# Patient Record
Sex: MALE | Race: WHITE | NOT HISPANIC OR LATINO | Employment: FULL TIME | URBAN - METROPOLITAN AREA
[De-identification: names, ages, dates, MRNs, and addresses within clinical notes are randomized per-mention and may not be internally consistent; named-entity substitution may affect disease eponyms.]

---

## 2022-03-04 ENCOUNTER — TELEPHONE (OUTPATIENT)
Dept: CARDIOLOGY | Facility: MEDICAL CENTER | Age: 67
End: 2022-03-04
Payer: COMMERCIAL

## 2022-03-04 NOTE — TELEPHONE ENCOUNTER
Spoke with pt who confirmed last [facility] cardiology visit was with RS in 2014. Per pt states that he [facility] [place] around [facility]. Fax sent requesting records.    Per pt has not had any recent labs or cardiac work up.    [facility].    Appt time and date confirmed with pt.

## 2022-03-09 ENCOUNTER — OFFICE VISIT (OUTPATIENT)
Dept: CARDIOLOGY | Facility: MEDICAL CENTER | Age: 67
End: 2022-03-09
Payer: COMMERCIAL

## 2022-03-09 VITALS
WEIGHT: 240.1 LBS | BODY MASS INDEX: 31.82 KG/M2 | RESPIRATION RATE: 18 BRPM | SYSTOLIC BLOOD PRESSURE: 150 MMHG | HEART RATE: 74 BPM | DIASTOLIC BLOOD PRESSURE: 72 MMHG | HEIGHT: 73 IN | OXYGEN SATURATION: 94 %

## 2022-03-09 DIAGNOSIS — Z72.0 TOBACCO ABUSE: ICD-10-CM

## 2022-03-09 DIAGNOSIS — I48.0 PAROXYSMAL ATRIAL FIBRILLATION (HCC): ICD-10-CM

## 2022-03-09 DIAGNOSIS — Z91.89 OTHER SPECIFIED PERSONAL RISK FACTORS, NOT ELSEWHERE CLASSIFIED: ICD-10-CM

## 2022-03-09 DIAGNOSIS — Z79.01 CHRONIC ANTICOAGULATION: ICD-10-CM

## 2022-03-09 DIAGNOSIS — I25.119 ATHEROSCLEROSIS OF NATIVE CORONARY ARTERY OF NATIVE HEART WITH ANGINA PECTORIS (HCC): ICD-10-CM

## 2022-03-09 DIAGNOSIS — E78.2 MIXED HYPERLIPIDEMIA: Chronic | ICD-10-CM

## 2022-03-09 DIAGNOSIS — I10 ESSENTIAL HYPERTENSION, BENIGN: ICD-10-CM

## 2022-03-09 PROCEDURE — 99406 BEHAV CHNG SMOKING 3-10 MIN: CPT | Performed by: INTERNAL MEDICINE

## 2022-03-09 PROCEDURE — 99204 OFFICE O/P NEW MOD 45 MIN: CPT | Mod: 25 | Performed by: INTERNAL MEDICINE

## 2022-03-09 PROCEDURE — 93000 ELECTROCARDIOGRAM COMPLETE: CPT | Performed by: INTERNAL MEDICINE

## 2022-03-09 RX ORDER — DOXYCYCLINE 100 MG/10ML
INJECTION, POWDER, LYOPHILIZED, FOR SOLUTION INTRAVENOUS 2 TIMES DAILY
COMMUNITY

## 2022-03-09 ASSESSMENT — ENCOUNTER SYMPTOMS
CLAUDICATION: 0
WEIGHT LOSS: 0
RESPIRATORY NEGATIVE: 1
EYES NEGATIVE: 1
CONSTITUTIONAL NEGATIVE: 1
PSYCHIATRIC NEGATIVE: 1
WEAKNESS: 0
SHORTNESS OF BREATH: 0
DEPRESSION: 0
NAUSEA: 0
COUGH: 0
CARDIOVASCULAR NEGATIVE: 1
FOCAL WEAKNESS: 0
CHILLS: 0
BRUISES/BLEEDS EASILY: 0
PALPITATIONS: 0
VOMITING: 0
NECK PAIN: 1
BLURRED VISION: 0
FEVER: 0
ABDOMINAL PAIN: 0
MYALGIAS: 0
DOUBLE VISION: 0
HEADACHES: 0
GASTROINTESTINAL NEGATIVE: 1
NERVOUS/ANXIOUS: 0
DIZZINESS: 0
NEUROLOGICAL NEGATIVE: 1

## 2022-03-09 NOTE — PROGRESS NOTES
Chief Complaint   Patient presents with   • Coronary Artery Disease     NP       Subjective     Manuel Chase is a 66 y.o. male who presents today for new patient establishment for coronary artery disease.    Mr. Chase is a 66 year old  with coronary artery disease, atrial fibrillation on chronic anticoagulation therapy, hypertension and hyperlipidemia, chronic tobacco abuse, no family history of coronary artery disease. He was previously seen in our office by Archie Erwin 2014, Molly Dc in 2013. He is clinically doing well. He denies chest pain, shortness of breath, palpitations, nausea/vomiting or diaphoresis. He obtains his care at [facility]. He is seeking clearance for DOT.    Past Medical History:   Diagnosis Date   • Acute pneumothorax    • Arthritis    • CAD (coronary artery disease)    • Chest pain, unspecified    • Coronary arteriosclerosis    • Coronary atherosclerosis of native coronary artery    • Diastolic dysfunction    • Dislocation, sprain and strain thorax with lower back and pelvis    • Dyspnea    • Familial HDL deficiency    • Hypertension    • Mixed hyperlipidemia    • Myocardial infarct (HCC)    • Obstructive sleep apnea    • Type 2 diabetes mellitus (HCC)      Past Surgical History:   Procedure Laterality Date   • RECOVERY  10/28/2010    Performed by SURGERY, CATH-RECOVERY at SURGERY SAME DAY [facility] ORS   • OTHER  1975    fistula   • OTHER ABDOMINAL SURGERY  1970    appendectomy   • ZZZ CARDIAC CATH       Family History   Problem Relation Age of Onset   • Diabetes Sister    • Heart Attack Maternal Uncle      Social History     Socioeconomic History   • Marital status:      Spouse name: Not on file   • Number of children: Not on file   • Years of education: Not on file   • Highest education level: Not on file   Occupational History   • Not on file   Tobacco Use   • Smoking status: Current Every Day Smoker   • Smokeless tobacco: Never Used   • Tobacco comment:  under half a pack a day   Substance and Sexual Activity   • Alcohol use: No   • Drug use: No   • Sexual activity: Not on file     Comment: .   Other Topics Concern   • Not on file   Social History Narrative   • Not on file     Social Determinants of Health     Financial Resource Strain: Not on file   Food Insecurity: Not on file   Transportation Needs: Not on file   Physical Activity: Not on file   Stress: Not on file   Social Connections: Not on file   Intimate Partner Violence: Not on file   Housing Stability: Not on file     No Known Allergies     (Medications reviewed.)  Outpatient Encounter Medications as of 3/9/2022   Medication Sig Dispense Refill   • apixaban (ELIQUIS) 5mg Tab Take 5 mg by mouth 2 times a day.     • doxycycline (VIBRAMYCIN) 100 MG Recon Soln Infuse  into a venous catheter 2 times a day.     • TART CHERRY PO Take  by mouth.     • Cholecalciferol (D3 PO) Take 2,000 mg by mouth every day.     • metoprolol (LOPRESSOR) 25 MG TABS Take 1 Tab by mouth 2 times a day. Needs to be seen for further refills. Thank you 60 Tab 0   • amlodipine (NORVASC) 10 MG TABS Take 1 Tab by mouth every day. 90 Each 3   • lisinopril (PRINIVIL) 10 MG TABS Take 1 Tab by mouth 2 times a day. 60 Tab 11   • metformin (GLUCOPHAGE) 500 MG TABS Take 500 mg by mouth 2 times a day, with meals.     • nitroglycerin (NITROSTAT) 0.4 MG SL Tab Place 0.4 mg under tongue every 5 minutes as needed.     • [DISCONTINUED] simvastatin (ZOCOR) 20 MG TABS Take 1 Tab by mouth every evening. (Patient not taking: Reported on 3/9/2022) 90 Tab 3   • [DISCONTINUED] hydrochlorothiazide (HYDRODIURIL) 25 MG TABS TAKE ONE TABLET BY MOUTH EVERY DAY (Patient not taking: Reported on 3/9/2022) 90 Each 1   • [DISCONTINUED] isosorbide mononitrate SR (IMDUR) 60 MG TB24 Take 1 Tab by mouth every morning. (Patient not taking: Reported on 3/9/2022) 30 Tab 11   • [DISCONTINUED] ezetimibe (ZETIA) 10 MG Tab Take 10 mg by mouth every day. (Patient not taking:  "Reported on 3/9/2022)     • aspirin (ASA) 81 MG CHEW Take 81 mg by mouth every day.       No facility-administered encounter medications on file as of 3/9/2022.     Review of Systems   Constitutional: Negative.  Negative for chills, fever, malaise/fatigue and weight loss.   HENT: Positive for hearing loss and tinnitus.    Eyes: Negative.  Negative for blurred vision and double vision.   Respiratory: Negative.  Negative for cough and shortness of breath.    Cardiovascular: Negative.  Negative for chest pain, palpitations, claudication and leg swelling.   Gastrointestinal: Negative.  Negative for abdominal pain, nausea and vomiting.   Genitourinary: Negative.  Negative for dysuria and urgency.   Musculoskeletal: Positive for neck pain. Negative for joint pain and myalgias.   Skin: Negative.  Negative for itching and rash.   Neurological: Negative.  Negative for dizziness, focal weakness, weakness and headaches.   Endo/Heme/Allergies: Negative.  Does not bruise/bleed easily.   Psychiatric/Behavioral: Negative.  Negative for depression. The patient is not nervous/anxious.               Objective     /72 (BP Location: Left arm, Patient Position: Sitting, BP Cuff Size: Adult)   Pulse 74   Resp 18   Ht 1.854 m (6' 1\")   Wt 109 kg (240 lb 1.6 oz)   SpO2 94%   BMI 31.68 kg/m²     Physical Exam  Constitutional:       Appearance: He is well-developed.   HENT:      Head: Normocephalic and atraumatic.   Neck:      Vascular: No JVD.   Cardiovascular:      Rate and Rhythm: Normal rate. Rhythm irregularly irregular.      Heart sounds: Normal heart sounds.   Pulmonary:      Effort: Pulmonary effort is normal.      Breath sounds: Normal breath sounds.   Abdominal:      General: Bowel sounds are normal.      Palpations: Abdomen is soft.      Comments: No hepatosplenomegaly.   Musculoskeletal:         General: Normal range of motion.   Lymphadenopathy:      Cervical: No cervical adenopathy.   Skin:     General: Skin is warm " and dry.   Neurological:      Mental Status: He is alert and oriented to person, place, and time.            CARDIAC STUDIES/PROCEDURES:    CARDIAC CATHETERIZATION CONCLUSIONS by Liliana Mathew (10/28/10)  1.  Coronary artery disease.The acute marginal branch also  has eccentric plaque in the range of 90 to 95%, but antegrade flow in this particular branch remains normal.Proximal portion of the posterolateral branch has eccentric plaque in the range to 40 to  50%. The severity of this stenosis is in the range of 50 to 60%.  More distally there is another discrete area of stenosis in the range of 50 to 60% as well.      2.  Normal left ventricular systolic function but with evidence of akinesis of the mid to distal portion of the inferior wall of the left ventricle.  (study result reviewed)    EKG was ordered for coronary artery disease, performed on (03/09/22) was reviewed: EKG, personally interpreted shows atrial fibrillation.    Laboratory results of (02/18/22) were reviewed. Cholesterol profile of 102/81/38/51 mg/dL noted.    STRESS ECHOCARDIOGRAM [facility] (04/28/17)  Stress echocardiogram showing ejection fraction of 55-70%, atrial flutter at baseline, no EKG or echo changes suggestive of inducible ischemia.  (study result reviewed)    Assessment & Plan     1. Atherosclerosis of native coronary artery of native heart with angina pectoris (HCC)  EKG   2. Paroxysmal atrial fibrillation (HCC)     3. Chronic anticoagulation     4. Essential hypertension, benign     5. Mixed hyperlipidemia         Medical Decision Making: Today's Assessment/Status/Plan:        1. Coronary artery disease: He remains clinically doing well. I will continue with current medical care including metoprolol, lisinopril, simvastatin. We will perform an echocardiogram and myocardial perfusion imaging study.  2. Atrial fibrillation on anticoagulation therapy (Eliquis): He is doing well on anticoagulation and the ventricular rate is well  controlled.  3. Hypertension: Blood pressure is well controlled. We will continue with amlodipine, beta blockade therapy and ace inhibitor therapy.  4. Hyperlipidemia: He is doing well on statin therapy without myalgia symptoms. We will repeat labs including fasting lipid profile.  5. Chronic tobacco use: Smoking cessation recommended with discussions of health effects and plans for over 3 minutes.    We will follow up after his tests.

## 2022-03-10 LAB — EKG IMPRESSION: NORMAL

## 2022-05-18 RX ORDER — SIMVASTATIN 20 MG
20 TABLET ORAL NIGHTLY
Qty: 30 TABLET | Refills: 11 | COMMUNITY
Start: 2022-05-18

## 2023-08-02 ENCOUNTER — TELEPHONE (OUTPATIENT)
Dept: CARDIOLOGY | Facility: MEDICAL CENTER | Age: 68
End: 2023-08-02
Payer: COMMERCIAL